# Patient Record
Sex: FEMALE
[De-identification: names, ages, dates, MRNs, and addresses within clinical notes are randomized per-mention and may not be internally consistent; named-entity substitution may affect disease eponyms.]

---

## 2018-03-12 ENCOUNTER — APPOINTMENT (OUTPATIENT)
Dept: SURGICAL ONCOLOGY | Facility: CLINIC | Age: 38
End: 2018-03-12
Payer: COMMERCIAL

## 2018-03-12 VITALS
OXYGEN SATURATION: 98 % | HEART RATE: 82 BPM | HEIGHT: 61 IN | SYSTOLIC BLOOD PRESSURE: 124 MMHG | WEIGHT: 165 LBS | BODY MASS INDEX: 31.15 KG/M2 | DIASTOLIC BLOOD PRESSURE: 87 MMHG

## 2018-03-12 PROCEDURE — 99213 OFFICE O/P EST LOW 20 MIN: CPT

## 2019-10-22 ENCOUNTER — APPOINTMENT (OUTPATIENT)
Dept: SURGICAL ONCOLOGY | Facility: CLINIC | Age: 39
End: 2019-10-22
Payer: COMMERCIAL

## 2019-10-22 VITALS
HEIGHT: 61 IN | HEART RATE: 77 BPM | DIASTOLIC BLOOD PRESSURE: 85 MMHG | SYSTOLIC BLOOD PRESSURE: 120 MMHG | BODY MASS INDEX: 28.32 KG/M2 | OXYGEN SATURATION: 99 % | WEIGHT: 150 LBS

## 2019-10-22 PROCEDURE — 99214 OFFICE O/P EST MOD 30 MIN: CPT

## 2020-04-15 NOTE — ASSESSMENT
[FreeTextEntry1] : Clinically doing well.\par \par Due for repeat chest x-ray, prescription provided for [facility]\par \par If asymptomatic, with normal imaging a dermatologic evaluation, we will see her in another year, sooner if needed\par \par \par 12/16/19.\par We spoke.\par December 12, 2019, she had a CT of the chest at [facility] because of atypical chest pain and the concern regarding a pulmonary embolism.\par That study was negative.\par Incidental LLL nodules (<5 mm diameter) for which a one year followup is recommended for December 2020.................

## 2020-04-15 NOTE — HISTORY OF PRESENT ILLNESS
[de-identified] : Last seen March 2018\par \par 39 year-old lady who in January 2004 had wide excision of a 1.1 mm melanoma from the anterior mid chest with negative margins and negative bilateral axillary sentinel node biopsies.\par Reconstruction was by Dr. Cunningham\par \par Presently asymptomatic\par \par No relatives with melanoma.\par \par \par Her dermatologist is Dr. Roger GAXIOLA\par spring 2019 visit was unremarkable\par \par Her internist was Dr. Dmitry Larios\par NOW SEES DR Vivien SANDOVAL\par \par Propranolol for hypertension.\par Prozac.\par Junel\par \par She has asthma which is treated with Advair, Singulair, and Fasenra\par She sees in asthma/allergy specialist: Dr. Pb PEPE.\par \par \par \par +RHEUMATOID ARTHRITIS\par Diagnosed ~2017\par Too many side effects from steroids\par Her rheumatologist is Dr. Luiza SINGH\par She was transiently on methotrexate, but stopped treatment due to significant alopecia.\par Has occasional flare-ups that she manages symptomatically with NSAIDs.\par Reluctant to take biologic therapy\par \par Her most recent eye examination: 2017\par \par Her gynecologist visit, Vanessa ([place]):September 2019 was okay\par \par \par Breast imaging due, prescriptions provided for [facility].\par Asymptomatic.\par No personal history of breast disease.\par No relatives with breast cancer.\par +FH:\par Her mother had ovarian cancer.\par \par \par CC: none

## 2020-04-15 NOTE — REASON FOR VISIT
[Follow-Up Visit] : a follow-up visit for [Other: _____] : [unfilled] [FreeTextEntry2] : Melanoma of the chest

## 2020-04-15 NOTE — PHYSICAL EXAM
[Normal] : supple, no neck mass and thyroid not enlarged [Normal Neck Lymph Nodes] : normal neck lymph nodes  [Normal Supraclavicular Lymph Nodes] : normal supraclavicular lymph nodes [Normal] : full range of motion and no deformities appreciated [Normal Axillary Lymph Nodes] : normal axillary lymph nodes [de-identified] : Groins not e [de-identified] : below

## 2021-02-05 ENCOUNTER — APPOINTMENT (OUTPATIENT)
Dept: SURGICAL ONCOLOGY | Facility: CLINIC | Age: 41
End: 2021-02-05
Payer: COMMERCIAL

## 2021-02-05 VITALS
SYSTOLIC BLOOD PRESSURE: 136 MMHG | BODY MASS INDEX: 27.75 KG/M2 | WEIGHT: 147 LBS | OXYGEN SATURATION: 98 % | DIASTOLIC BLOOD PRESSURE: 84 MMHG | RESPIRATION RATE: 12 BRPM | HEIGHT: 61 IN | HEART RATE: 77 BPM

## 2021-02-05 PROCEDURE — 99214 OFFICE O/P EST MOD 30 MIN: CPT

## 2021-02-05 PROCEDURE — 99072 ADDL SUPL MATRL&STAF TM PHE: CPT

## 2021-03-10 NOTE — HISTORY OF PRESENT ILLNESS
[de-identified] : 40 year-old lady who in January 2004 had wide excision of a 1.1 mm melanoma from the ANTERIOR MID-CHEST with negative margins and negative bilateral axillary sentinel node biopsies.\par Reconstruction was by Dr. Cunningham\par \par Presently asymptomatic\par \par No relatives with melanoma.\par \par \par Her dermatologist is Dr. Roger GAXIOLA\par spring 2020 visit was unremarkable\par \par \par PMD:  Dr. Dmitry HANNA.\par Due to changes in insurance, she transiently had seen: DR Vivien Hernandez\par \par Propranolol for hypertension.\par Prozac.\par \par \par She has asthma which is treated with Advair, Singulair, and Fasenra\par She sees in asthma/allergy specialist: Dr. Pb PEPE.\par \par \par \par +RHEUMATOID ARTHRITIS\par Diagnosed ~2017\par Too many side effects from steroids\par Her rheumatologist is Dr. Brandon SINGH\par She was transiently on methotrexate, but stopped treatment due to significant alopecia.\par Has occasional flare-ups that she manages symptomatically with NSAIDs.\par Reluctant to take biologic therapy\par \par \par Her most recent eye examination: 2019.\par She will be scheduling a follow-up visit\par \par \par Her gynecologist visit, Dr Robbin CARRILLO ([place]):September 2019 was okay\par \par \par November 2020: Bilateral mammogram @[facility]: Await results.:  12-23-20: BI-RADS 2\par Asymptomatic.\par No personal history of breast disease.\par No relatives with breast cancer.\par +FH:\par Her mother had uterine cancer.\par \par No other relatives with a history of malignancy\par \par \par CC: none

## 2021-03-10 NOTE — PHYSICAL EXAM
[Normal] : supple, no neck mass and thyroid not enlarged [Normal Neck Lymph Nodes] : normal neck lymph nodes  [Normal Supraclavicular Lymph Nodes] : normal supraclavicular lymph nodes [Normal Axillary Lymph Nodes] : normal axillary lymph nodes [Normal] : full range of motion and no deformities appreciated [de-identified] : Groins not e [de-identified] : below

## 2021-03-10 NOTE — ASSESSMENT
[FreeTextEntry1] : Clinically doing well.\par \par \par 12/16/19.\par We spoke.\par December 12, 2019, she had a CT of the chest at  because of atypical chest pain and the concern regarding a pulmonary embolism.\par That study was negative.\par Incidental LLL nodules (<5 mm diameter) for which a one year followup is due presently, prescription provided\par \par If asymptomatic, with normal imaging a dermatologic evaluation, we will see her in another year, sooner if needed\par \par \par 3/6/2021.\par She and I spoke.\par March 3, 2021, she had her CT chest at .\par 1.  No changes in her lungs, no worrisome findings.\par 2.  Incidental liver nodule, not previously seen.\par Liver MRI recommended.\par I will mail her a prescription on March 4.\par \par \par 3/10/2021.\par We spoke.\par March 8, 2021, she had the abdominal MRI at  to evaluate the incidental equivocal liver finding on her CT chest.\par Results indicate a 2.5 cm hemangioma at the dome of the liver, without suspicious features.\par We will follow up with an abdominal sonogram at a 6-month interval, September 2021.\par Prescriptions will be mailed March 11.

## 2021-03-10 NOTE — REVIEW OF SYSTEMS
[Negative] : Heme/Lymph [FreeTextEntry5] : Hypertension [FreeTextEntry6] : Asthma [FreeTextEntry7] : Rheumatoid arthritis [de-identified] : Melanoma

## 2021-03-10 NOTE — REASON FOR VISIT
[Follow-Up Visit] : a follow-up visit for [Other: _____] : [unfilled] [FreeTextEntry2] : Stage II melanoma of the mid chest

## 2022-09-01 ENCOUNTER — APPOINTMENT (OUTPATIENT)
Dept: SURGICAL ONCOLOGY | Facility: CLINIC | Age: 42
End: 2022-09-01

## 2022-09-01 VITALS
SYSTOLIC BLOOD PRESSURE: 120 MMHG | DIASTOLIC BLOOD PRESSURE: 79 MMHG | RESPIRATION RATE: 16 BRPM | OXYGEN SATURATION: 98 % | HEIGHT: 61 IN | TEMPERATURE: 97.7 F | HEART RATE: 86 BPM | WEIGHT: 147 LBS | BODY MASS INDEX: 27.75 KG/M2

## 2022-09-01 PROCEDURE — 99214 OFFICE O/P EST MOD 30 MIN: CPT

## 2022-09-01 NOTE — ASSESSMENT
[FreeTextEntry1] : Clinically doing well.\par \par \par March 3, 2021:\par CT chest at .\par 1.  No changes in her lungs, no worrisome findings.\par 2.  Incidental liver nodule, not previously seen.\par Liver MRI recommended.\par \par March 8, 2021:\par abdominal MRI at  to evaluate the incidental equivocal liver finding on her CT chest.\par Results indicate a 2.5 cm hemangioma at the dome of the liver, without suspicious features.\par .\par \par \par Follow-up CT chest due presently.\par Prescription provided for .\par \par If unremarkable, we should see her in a year, sooner if needed

## 2022-09-01 NOTE — PHYSICAL EXAM
[Normal] : supple, no neck mass and thyroid not enlarged [Normal Neck Lymph Nodes] : normal neck lymph nodes  [Normal Supraclavicular Lymph Nodes] : normal supraclavicular lymph nodes [Normal Axillary Lymph Nodes] : normal axillary lymph nodes [Normal] : full range of motion and no deformities appreciated [de-identified] : Groins not e [de-identified] : below

## 2022-09-01 NOTE — REVIEW OF SYSTEMS
[Negative] : Heme/Lymph [FreeTextEntry5] : Hypertension [FreeTextEntry6] : Pulmonary micronodules [FreeTextEntry7] : Rheumatoid arthritis [de-identified] : Melanoma

## 2022-09-01 NOTE — REASON FOR VISIT
[Follow-Up Visit] : a follow-up visit for [Other: _____] : [unfilled] [FreeTextEntry2] : Mid chest melanoma, micronodules on CT chest

## 2022-09-01 NOTE — HISTORY OF PRESENT ILLNESS
[de-identified] : 42 year-old lady.\par \par January 2004: Wide excision of a 1.1 mm melanoma from the ANTERIOR MID-CHEST with negative margins and negative bilateral axillary sentinel node biopsies.\par Reconstruction was by Dr. Yoseph Cunningham.\par \par \par Presently asymptomatic\par \par No relatives with melanoma.\par \par \par No other personal history of malignancy.\par \par \par Family history of malignancy:\par Mother: Uterine cancer\par \par \par Her dermatologist is Dr. Roger GAXIOLA\par September 2022 visit is scheduled\par \par \par PMD: Dr. Stephane ELIZABETH.\par Dr. Dmitry Larios retired in spring 2022.\par Due to changes in insurance, she transiently had seen: DR Vivien Hernandez.\par \par No pacemaker or defibrillator.\par No anticoagulants\par \par Propranolol for hypertension.\par \par Prozac.\par \par She has asthma which is treated with Advair, Singulair, and Fasenra\par She sees in asthma/allergy specialist: Dr. Pb PEPE.\par \par +RHEUMATOID ARTHRITIS\par Diagnosed ~2017\par Too many side effects from steroids\par Her rheumatologist is Dr. Brandon SINGH\par She was transiently on methotrexate, but stopped treatment due to significant alopecia.\par Has occasional flare-ups that she manages symptomatically with NSAIDs.\par Reluctant to take biologic therapy\par \par \par Her most recent eye examination: Summer 2021 was okay\par \par \par Her gynecologist visit, Dr Robbin CARRILLO ([place]): November 2021 was okay was okay\par \par \par November 2021: Bilateral mammogram @[facility]: Okay\par Asymptomatic.\par No personal history of breast disease.\par No relatives with breast cancer.\par +FH:\par Her mother had uterine cancer.\par \par No other relatives with a history of malignancy\par \par \par Colonoscopy will be between age 45 and 50

## 2022-09-28 ENCOUNTER — NON-APPOINTMENT (OUTPATIENT)
Age: 42
End: 2022-09-28

## 2023-09-10 NOTE — REVIEW OF SYSTEMS
[Negative] : Heme/Lymph [FreeTextEntry5] : Hypertension [FreeTextEntry6] : Asthma [FreeTextEntry7] : Latex allergy [de-identified] : Melanoma [de-identified] : Arthritis [de-identified] : Medications include Prozac

## 2023-09-10 NOTE — HISTORY OF PRESENT ILLNESS
[de-identified] : 43-year-old lady.  January 2004:  Wide excision of a 1.1 mm melanoma from the ANTERIOR MID-CHEST with negative margins and negative bilateral axillary sentinel node biopsies. Reconstruction was by Dr. Yoseph GUSTAFSON.   Presently asymptomatic  No relatives with melanoma.   No other personal history of malignancy.   Family history of malignancy: Mother: Uterine cancer   Her dermatologist is Dr. Roger GAXIOLA September 2022 visit is scheduled   PMD: Dr. Stephane ELIZABETH. Dr. Dmitry Larios retired in spring 2022. Due to changes in insurance, she transiently had seen: DR Vivien Hernandez.  + ALLERGIC: Latex.  No pacemaker or defibrillator. No anticoagulants  Propranolol for hypertension.  Prozac.  She has asthma which is treated with Advair, Singulair, and Fasenra She sees in asthma/allergy specialist: Dr. Pb PEPE.  +RHEUMATOID ARTHRITIS Diagnosed ~2017 Too many side effects from steroids Her rheumatologist is Dr. Brandon SINGH She was transiently on methotrexate, but stopped treatment due to significant alopecia. Has occasional flare-ups that she manages symptomatically with NSAIDs. Reluctant to take biologic therapy   Her most recent eye examination: Summer 2021 was okay   Her gynecologist visit, Dr Robbin CARRILLO ([place]): November 2021 was okay was okay   November 2021: Bilateral mammogram @[facility]: Okay Asymptomatic. No personal history of breast disease. No relatives with breast cancer. +FH: Her mother had uterine cancer.  No other relatives with a history of malignancy   Colonoscopy will be between age 45 and 50

## 2023-09-10 NOTE — REASON FOR VISIT
[Follow-Up Visit] : a follow-up visit for [Other: _____] : [unfilled] [FreeTextEntry2] : Mid chest melanoma, equivocal pulmonary imaging

## 2023-09-10 NOTE — ASSESSMENT
[FreeTextEntry1] : Clinically doing well.   March 3, 2021: CT chest at . 1.  No changes in her lungs, no worrisome findings. 2.  Incidental liver nodule, not previously seen. Liver MRI recommended.  March 8, 2021: abdominal MRI at  to evaluate the incidental equivocal liver finding on her CT chest. Results indicate a 2.5 cm hemangioma at the dome of the liver, without suspicious features.  October 2022: CT chest at : No changes noted.  Prescription provided for October 2023.  If unremarkable, we should see her in a year, sooner if needed

## 2023-09-10 NOTE — PHYSICAL EXAM
[Normal] : supple, no neck mass and thyroid not enlarged [Normal Neck Lymph Nodes] : normal neck lymph nodes  [Normal Supraclavicular Lymph Nodes] : normal supraclavicular lymph nodes [Normal Axillary Lymph Nodes] : normal axillary lymph nodes [Normal] : full range of motion and no deformities appreciated [de-identified] : Groins not examined [de-identified] : See diagram

## 2023-09-11 ENCOUNTER — APPOINTMENT (OUTPATIENT)
Dept: SURGICAL ONCOLOGY | Facility: CLINIC | Age: 43
End: 2023-09-11
Payer: COMMERCIAL

## 2023-09-11 VITALS
HEART RATE: 87 BPM | RESPIRATION RATE: 16 BRPM | DIASTOLIC BLOOD PRESSURE: 90 MMHG | BODY MASS INDEX: 27.38 KG/M2 | HEIGHT: 61 IN | OXYGEN SATURATION: 99 % | WEIGHT: 145 LBS | SYSTOLIC BLOOD PRESSURE: 133 MMHG

## 2023-09-11 DIAGNOSIS — C43.59 MALIGNANT MELANOMA OF OTHER PART OF TRUNK: ICD-10-CM

## 2023-09-11 PROCEDURE — 99214 OFFICE O/P EST MOD 30 MIN: CPT

## 2024-09-09 ENCOUNTER — APPOINTMENT (OUTPATIENT)
Dept: SURGICAL ONCOLOGY | Facility: CLINIC | Age: 44
End: 2024-09-09
Payer: COMMERCIAL

## 2024-09-09 VITALS
HEIGHT: 61 IN | BODY MASS INDEX: 27 KG/M2 | DIASTOLIC BLOOD PRESSURE: 84 MMHG | OXYGEN SATURATION: 99 % | HEART RATE: 85 BPM | SYSTOLIC BLOOD PRESSURE: 126 MMHG | WEIGHT: 143 LBS

## 2024-09-09 DIAGNOSIS — C43.59 MALIGNANT MELANOMA OF OTHER PART OF TRUNK: ICD-10-CM

## 2024-09-09 PROCEDURE — 99214 OFFICE O/P EST MOD 30 MIN: CPT

## 2024-09-10 NOTE — HISTORY OF PRESENT ILLNESS
[de-identified] : 44-year-old lady.  Annual melanoma follow-up.  CC: today she is asymptomatic.   January 2004:  Wide excision of a 1.1 mm melanoma from the ANTERIOR MID-CHEST with negative margins and negative bilateral axillary sentinel node biopsies. Reconstruction was by Dr. Yoseph GUSTAFSON.   No other personal history of malignancy.   No relatives with melanoma.   No other personal history of malignancy.   Family history of malignancy: Mother: Uterine cancer   Her dermatologist is Dr. Roger GAXIOLA. Summer 2024 visit was unremarkable.    PMD: Dr. Stephane ELIZABETH. Dr. Dmitry Trent retired in spring 2022. Due to changes in insurance, she transiently saw: DR Vivien Hernandez.  + ALLERGIC: Latex, and seafood.  No pacemaker or defibrillator. No anticoagulants  Propranolol for hypertension. She does not see a cardiologist.  Prozac.  She has asthma, which is treated with Advair, Singulair, and Nucala injections. She sees in asthma/allergy specialist: Dr. Pb PEPE.  +RHEUMATOID ARTHRITIS. Diagnosed ~2017. Currently not taking any medications for this problem. Too many side effects from steroids Her rheumatologist is Dr. Brandon SINGH. She was transiently on methotrexate but stopped treatment due to significant alopecia. Has occasional flare-ups that she manages symptomatically with NSAIDs. Reluctant to take biologic therapy.   Her most recent eye examination: September 2023 in [place] at [facility].   Her gynecologist visit, Dr Robbin CARRILLO ([place]):   January 2024 was okay.   January 2024: Bilateral mammogram @[facility]: BI-RADS 1. Prescription provided today for January 2025. Asymptomatic. No personal history of breast disease. No relatives with breast cancer. +FH: Her mother had uterine cancer.  No other relatives with a history of malignancy   Colonoscopy will be between age 45 and 50.

## 2024-09-10 NOTE — HISTORY OF PRESENT ILLNESS
[de-identified] : 44-year-old lady.  Annual melanoma follow-up.  CC: today she is asymptomatic.   January 2004:  Wide excision of a 1.1 mm melanoma from the ANTERIOR MID-CHEST with negative margins and negative bilateral axillary sentinel node biopsies. Reconstruction was by Dr. Yoseph GUSTAFSON.   No other personal history of malignancy.   No relatives with melanoma.   No other personal history of malignancy.   Family history of malignancy: Mother: Uterine cancer   Her dermatologist is Dr. Roger GAXIOLA. Summer 2024 visit was unremarkable.    PMD: Dr. Stephane ELIZABETH. Dr. Dmitry Trent retired in spring 2022. Due to changes in insurance, she transiently saw: DR Vivien Hernandez.  + ALLERGIC: Latex, and seafood.  No pacemaker or defibrillator. No anticoagulants  Propranolol for hypertension. She does not see a cardiologist.  Prozac.  She has asthma, which is treated with Advair, Singulair, and Nucala injections. She sees in asthma/allergy specialist: Dr. Pb PEPE.  +RHEUMATOID ARTHRITIS. Diagnosed ~2017. Currently not taking any medications for this problem. Too many side effects from steroids Her rheumatologist is Dr. Brandon SINGH. She was transiently on methotrexate but stopped treatment due to significant alopecia. Has occasional flare-ups that she manages symptomatically with NSAIDs. Reluctant to take biologic therapy.   Her most recent eye examination: September 2023 in [place] at [facility].   Her gynecologist visit, Dr Robbin CARRILLO ([place]):   January 2024 was okay.   January 2024: Bilateral mammogram @[place]: BI-RADS 1. Prescription provided today for January 2025. Asymptomatic. No personal history of breast disease. No relatives with breast cancer. +FH: Her mother had uterine cancer.  No other relatives with a history of malignancy   Colonoscopy will be between age 45 and 50.

## 2024-09-10 NOTE — REVIEW OF SYSTEMS
[Negative] : Heme/Lymph [FreeTextEntry5] : Hypertension [FreeTextEntry7] : Latex allergy [de-identified] : Rheumatoid arthritis [de-identified] : Melanoma [de-identified] : Medications include Prozac

## 2024-09-10 NOTE — ASSESSMENT
[FreeTextEntry1] : 44-year-old lady.  January 2004: Wide excision of a 1.1 mm melanoma from the upper mid chest with negative margins and negative bilateral axillary sentinel node biopsies.  Today she is asymptomatic with a normal physical examination.:   10/3/2023: CT chest at : No suspicious findings or interval changes. Prescription provided for October 2024  Chest x-ray.    If unremarkable, we should see her in a year, sooner if needed.

## 2024-09-10 NOTE — PHYSICAL EXAM
[Normal] : supple, no neck mass and thyroid not enlarged [Normal Neck Lymph Nodes] : normal neck lymph nodes  [Normal Supraclavicular Lymph Nodes] : normal supraclavicular lymph nodes [Normal Axillary Lymph Nodes] : normal axillary lymph nodes [Normal] : full range of motion and no deformities appreciated [de-identified] : See diagram [de-identified] : Groins not examined

## 2024-09-10 NOTE — REASON FOR VISIT
[Follow-Up Visit] : a follow-up visit for [Other: _____] : [unfilled] [FreeTextEntry2] : 1.1 mm melanoma of the upper mid chest excised with negative margins and negative bilateral axillary sentinel node biopsies in January 2004.

## 2024-09-10 NOTE — HISTORY OF PRESENT ILLNESS
[de-identified] : 44-year-old lady.  Annual melanoma follow-up.  CC: today she is asymptomatic.   January 2004:  Wide excision of a 1.1 mm melanoma from the ANTERIOR MID-CHEST with negative margins and negative bilateral axillary sentinel node biopsies. Reconstruction was by Dr. Yoseph GUSTAFSON.   No other personal history of malignancy.   No relatives with melanoma.   No other personal history of malignancy.   Family history of malignancy: Mother: Uterine cancer   Her dermatologist is Dr. Roger GAXIOLA. Summer 2024 visit was unremarkable.    PMD: Dr. Stephane ELIZABETH. Dr. Dmitry Trent retired in spring 2022. Due to changes in insurance, she transiently saw: DR Vivien Hernandez.  + ALLERGIC: Latex, and seafood.  No pacemaker or defibrillator. No anticoagulants  Propranolol for hypertension. She does not see a cardiologist.  Prozac.  She has asthma, which is treated with Advair, Singulair, and Nucala injections. She sees in asthma/allergy specialist: Dr. Pb PEPE.  +RHEUMATOID ARTHRITIS. Diagnosed ~2017. Currently not taking any medications for this problem. Too many side effects from steroids Her rheumatologist is Dr. Brandon SINGH. She was transiently on methotrexate but stopped treatment due to significant alopecia. Has occasional flare-ups that she manages symptomatically with NSAIDs. Reluctant to take biologic therapy.   Her most recent eye examination: September 2023 in [place] at [facility].   Her gynecologist visit, Dr Robbin CARRILLO ([place]):   January 2024 was okay.   January 2024: Bilateral mammogram @[facility]: BI-RADS 1. Prescription provided today for January 2025. Asymptomatic. No personal history of breast disease. No relatives with breast cancer. +FH: Her mother had uterine cancer.  No other relatives with a history of malignancy   Colonoscopy will be between age 45 and 50.

## 2024-09-10 NOTE — REVIEW OF SYSTEMS
[Negative] : Heme/Lymph [FreeTextEntry5] : Hypertension [FreeTextEntry7] : Latex allergy [de-identified] : Rheumatoid arthritis [de-identified] : Melanoma [de-identified] : Medications include Prozac

## 2024-09-10 NOTE — PHYSICAL EXAM
[Normal] : supple, no neck mass and thyroid not enlarged [Normal Neck Lymph Nodes] : normal neck lymph nodes  [Normal Supraclavicular Lymph Nodes] : normal supraclavicular lymph nodes [Normal Axillary Lymph Nodes] : normal axillary lymph nodes [Normal] : full range of motion and no deformities appreciated [de-identified] : Groins not examined [de-identified] : See diagram

## 2024-09-10 NOTE — PHYSICAL EXAM
[Normal] : supple, no neck mass and thyroid not enlarged [Normal Neck Lymph Nodes] : normal neck lymph nodes  [Normal Supraclavicular Lymph Nodes] : normal supraclavicular lymph nodes [Normal Axillary Lymph Nodes] : normal axillary lymph nodes [Normal] : full range of motion and no deformities appreciated [de-identified] : See diagram [de-identified] : Groins not examined

## 2024-09-10 NOTE — REVIEW OF SYSTEMS
[Negative] : Heme/Lymph [FreeTextEntry5] : Hypertension [FreeTextEntry7] : Latex allergy [de-identified] : Rheumatoid arthritis [de-identified] : Melanoma [de-identified] : Medications include Prozac

## 2024-10-10 ENCOUNTER — NON-APPOINTMENT (OUTPATIENT)
Age: 44
End: 2024-10-10

## 2024-10-10 DIAGNOSIS — C43.59 MALIGNANT MELANOMA OF OTHER PART OF TRUNK: ICD-10-CM

## 2025-08-23 ENCOUNTER — NON-APPOINTMENT (OUTPATIENT)
Age: 45
End: 2025-08-23

## 2025-09-06 ENCOUNTER — NON-APPOINTMENT (OUTPATIENT)
Age: 45
End: 2025-09-06

## 2025-09-08 ENCOUNTER — APPOINTMENT (OUTPATIENT)
Dept: SURGICAL ONCOLOGY | Facility: CLINIC | Age: 45
End: 2025-09-08
Payer: COMMERCIAL

## 2025-09-08 VITALS
DIASTOLIC BLOOD PRESSURE: 84 MMHG | RESPIRATION RATE: 17 BRPM | WEIGHT: 139 LBS | HEART RATE: 96 BPM | SYSTOLIC BLOOD PRESSURE: 134 MMHG | HEIGHT: 61 IN | OXYGEN SATURATION: 99 % | BODY MASS INDEX: 26.24 KG/M2

## 2025-09-08 DIAGNOSIS — C43.59 MALIGNANT MELANOMA OF OTHER PART OF TRUNK: ICD-10-CM

## 2025-09-08 PROCEDURE — 99215 OFFICE O/P EST HI 40 MIN: CPT
